# Patient Record
Sex: FEMALE | Race: WHITE
[De-identification: names, ages, dates, MRNs, and addresses within clinical notes are randomized per-mention and may not be internally consistent; named-entity substitution may affect disease eponyms.]

---

## 2017-01-11 ENCOUNTER — HOSPITAL ENCOUNTER (EMERGENCY)
Dept: HOSPITAL 39 - ER | Age: 47
Discharge: HOME | End: 2017-01-11
Payer: SELF-PAY

## 2017-01-11 VITALS — DIASTOLIC BLOOD PRESSURE: 75 MMHG | SYSTOLIC BLOOD PRESSURE: 107 MMHG | OXYGEN SATURATION: 99 %

## 2017-01-11 VITALS — TEMPERATURE: 98.3 F

## 2017-01-11 DIAGNOSIS — Z88.0: ICD-10-CM

## 2017-01-11 DIAGNOSIS — Z87.891: ICD-10-CM

## 2017-01-11 DIAGNOSIS — Z88.6: ICD-10-CM

## 2017-01-11 DIAGNOSIS — M54.81: Primary | ICD-10-CM

## 2017-01-11 DIAGNOSIS — Z86.19: ICD-10-CM

## 2017-01-11 NOTE — ED.PDOC
History of Present Illness





- General


Chief Complaint: General


Stated Complaint: scalp pain


Time Seen by Provider: 01/11/17 10:19


Source: patient


Exam Limitations: no limitations





- History of Present Illness


Initial Comments: 


the patient is a 46-year-old  female presenting due to scalp pain that 

was fairly severe 2 weeks ago for approximately a 24-hour period.  The pain 

largely went away for a period of 2 weeks but came back yesterday.  She reports 

it as a drawing, burning, crawling sensation.  She originally thought that it 

was from a scorpion sting.  She has not noted any true fevers.  She has felt 

some significant malaise for the last few weeks.  No focal neurological changes 

otherwise.  Hot water seems to make the pain worse.  No recent injuries.  No 

change in medications recently.  No confusion.  She does have a mild headache 

related to it.  Again no rash or skin changes.


Timing/Duration: 24 hours


Severity: moderate


Improving Factors: nothing


Worsening Factors: other - Hot water


Associated Symptoms: headaches


Allergies/Adverse Reactions: 


Allergies





Ketorolac Tromethamine [From Toradol] Allergy (Verified 01/11/17 10:37)


 


Penicillin G Allergy (Verified 01/11/17 10:37)


 








Home Medications: 


Ambulatory Orders





Gabapentin 300 mg PO Q8H #45 cap 01/11/17 


Levothyroxine Sodium [Synthroid] 50 mcg PO DAILY 01/11/17 


Valacyclovir HCl [Valtrex] 1 gm PO Q8H #21 tab 01/11/17 











Review of Systems





- Review of Systems


Constitutional: States: malaise


EENTM: States: no symptoms reported


Respiratory: States: no symptoms reported


Cardiology: States: no symptoms reported


Gastrointestinal/Abdominal: States: no symptoms reported


Genitourinary: States: no symptoms reported


Musculoskeletal: States: no symptoms reported


Skin: States: no symptoms reported


Neurological: States: paresthesia


Endocrine: States: no symptoms reported


All other Systems: No Change from Baseline





Past Medical History (General)





- Patient Medical History


Hx Seizures: No


Hx Stroke: No


Hx Dementia: No


Hx Asthma: No


Hx of COPD: No


Hx Cardiac Disorders: No


Hx Congestive Heart Failure: No


Hx Pacemaker: No


Hx Hypertension: No


Hx Thyroid Disease: No


Hx Diabetes: No


Hx Gastroesophageal Reflux: No


Hx Renal Disease: No


Hx of HIV: No


Hx Hepatitis C: Yes


Hx MRSA: No





- Vaccination History


Hx Tetanus, Diphtheria Vaccination: No


Hx Influenza Vaccination: Yes


Hx Pneumococcal Vaccination: No


Immunizations Up to Date: No





- Social History


Hx Tobacco Use: Yes


Hx Alcohol Use: Yes - occ


Hx Substance Use: No


Hx Physical Abuse: No


Hx Emotional Abuse: No


Hx Suspected Abuse: No





- Activities of Daily Living


Hospice Agency (if applicable):: None





- Female History


Patient is a Female of Child Bearing Age (10 -59 yrs old): No


Patient Pregnant: No





Family Medical History





- Family History


  ** Mother


Family History: No Known





Physical Exam





- Physical Exam


General Appearance: Alert, Comfortable, No apparent distress


Eye Exam: bilateral normal


Ears, Nose, Throat: normal ENT inspection, normal pharynx


Neck: full range of motion, supple, normal inspection


Respiratory: chest non-tender, lungs clear, normal breath sounds, no 

respiratory distress, no accessory muscle use


Cardiovascular/Chest: normal peripheral pulses, regular rate, rhythm, no edema


Peripheral Pulses: radial,right: 2+, radial,left: 2+


Gastrointestinal/Abdominal: normal bowel sounds, non tender, soft


Rectal Exam: deferred


Back Exam: normal inspection


Extremity: normal range of motion, non-tender, normal inspection, no pedal edema

, normal capillary refill


Neurologic: no motor/sensory deficits, alert, normal mood/affect, oriented x 3


Skin Exam: normal color


Comments: 


 Vital Signs - 24 hr











  01/11/17





  10:25


 


Temperature 98.3 F


 


Pulse Rate [ 95 H





pulse ox] 


 


Respiratory 18





Rate 


 


Blood Pressure 105/72





[Left Arm] 


 


O2 Sat by Pulse 95





Oximetry 








the scalp is tender to palpation over the area in question which does coincide 

with the greater occipital nerve on the left.  There are no skin lesions.  

There is no erythema.  There is no abscess.  There is no hair loss.





Progress





- Progress


Progress: 





01/11/17 11:52


the patient is a 46-year-old  female that appears to have occipital 

neuralgia in the distribution of the greater occipital nerve on the left.  She 

will be placed on Neurontin as well as Valtrex.  This is possibly shingles that 

has not yet broken the surface.  Aleve can be used as well for discomfort.  She 

does have a mild elevation of her liver function tests that does need to be 

followed up with her primary care doctor.  ER warnings are given for any acute 

worsening.  Lab work is reassuring.  If she fails to improve with the above 

treatment, then evaluation for possible impingement of the greater occipital 

nerve may be worthwhile, and treatment varied accordingly.  She needs to follow-

up with her primary care doctor within 1 week.





- Results/Orders


Results/Orders: 


 Laboratory Tests











  01/11/17





  10:48


 


WBC  8.3


 


RBC  5.27


 


Hgb  15.4


 


Hct  45.8


 


MCV  86.8


 


MCH  29.2


 


MCHC  33.6


 


RDW  13.5


 


Plt Count  232


 


MPV  9.6


 


Absolute Neuts (auto)  5.90


 


Absolute Lymphs (auto)  1.50


 


Absolute Monos (auto)  0.80


 


Absolute Eos (auto)  0.10


 


Absolute Basos (auto)  0.00


 


Neutrophils %  70.9


 


Lymphocytes %  17.7 L


 


Monocytes %  10.2 H


 


Eosinophils %  0.8 L


 


Basophils %  0.4


 


Sodium  137


 


Potassium  3.7


 


Chloride  105


 


Carbon Dioxide  27


 


Anion Gap  8.7 L


 


BUN  7


 


Creatinine  0.65


 


BUN/Creatinine Ratio  10.8


 


Random Glucose  100


 


Serum Osmolality  271.9 L


 


Calcium  8.8


 


Magnesium  1.9


 


Total Bilirubin  0.8


 


AST  53 H


 


ALT  68 H


 


Alkaline Phosphatase  57


 


Creatine Kinase  82


 


CK-MB (CK-2)  1.6


 


CK-MB (CK-2) %  Not Reportable


 


Troponin I  < 0.02


 


Serum Total Protein  7.7


 


Albumin  4.2


 


Globulin  3.5


 


Albumin/Globulin Ratio  1.2














Departure





- Departure


Clinical Impression: 


 Occipital neuralgia of left side


Disposition: Discharge to Home or Self Care


Departure Forms:  ED Discharge - Pt. Copy, Patient Portal Self Enrollment


Instructions:  DI for Neuralgia


Diet: regular diet


Activity: increase activity as tolerated


Referrals: 


DEMETRIA ROLDAN IV, FNP [Primary Care Provider] - 1-2 Weeks


Prescriptions: 


Gabapentin 300 mg PO Q8H #45 cap


Valacyclovir HCl [Valtrex] 1 gm PO Q8H #21 tab


Home Medications: 


Ambulatory Orders





Gabapentin 300 mg PO Q8H #45 cap 01/11/17 


Levothyroxine Sodium [Synthroid] 50 mcg PO DAILY 01/11/17 


Valacyclovir HCl [Valtrex] 1 gm PO Q8H #21 tab 01/11/17 








Additional Instructions: 


the patient is a 46-year-old  female that appears to have occipital 

neuralgia in the distribution of the greater occipital nerve on the left.  She 

will be placed on Neurontin as well as Valtrex.  This is possibly shingles that 

has not yet broken the surface.  Aleve can be used as well for discomfort.  She 

does have a mild elevation of her liver function tests that does need to be 

followed up with her primary care doctor.  ER warnings are given for any acute 

worsening.  Lab work is reassuring.  If she fails to improve with the above 

treatment, then evaluation for possible impingement of the greater occipital 

nerve may be worthwhile, and treatment varied accordingly.  She needs to follow-

up with her primary care doctor within 1 week.  intermittent Aleve may help as 

well.

## 2019-07-23 ENCOUNTER — HOSPITAL ENCOUNTER (OUTPATIENT)
Dept: HOSPITAL 39 - LAB.O | Age: 49
End: 2019-07-23
Attending: NURSE PRACTITIONER
Payer: COMMERCIAL

## 2019-07-23 DIAGNOSIS — B18.2: Primary | ICD-10-CM

## 2019-07-26 ENCOUNTER — HOSPITAL ENCOUNTER (OUTPATIENT)
Dept: HOSPITAL 39 - CT | Age: 49
End: 2019-07-26
Attending: NURSE PRACTITIONER
Payer: COMMERCIAL

## 2019-07-26 DIAGNOSIS — Z90.49: ICD-10-CM

## 2019-07-26 DIAGNOSIS — B18.2: Primary | ICD-10-CM

## 2019-07-26 NOTE — CT
EXAM DESCRIPTION: CT ABDOMEN WITHOUT AND WITH CONTRAST



CLINICAL HISTORY: CHRONIC VIRAL HEPATITIS



COMPARISON: None Available.



TECHNIQUE: CT of the abdomen is performed prior to and during IV

bolus administration of 100 mL Isovue 300. 



FINDINGS: 

The lung bases are clear.

The liver is normal in contour. The liver is upper limits of

normal in size measuring 17.6 cm in craniocaudal dimension. No

focal hepatic lesions seen. No evidence of periportal edema or

peripancreatic fluid collection. 

Prior cholecystectomy. There is normal caliber and tapering of

the CBD.

Spleen, pancreas, adrenals, and kidneys are unremarkable.

No bowel obstruction, the appendix is unremarkable.

There is no lymphadenopathy, inflammation, or free fluid

observed.



IMPRESSION:



Normal liver contour and enhancement.



Prior cholecystectomy.



This exam was performed according to our departmental

dose-optimization program, which includes automated exposure

control, adjustment of the mA and/or kV according to patient size

and/or use of iterative reconstruction technique.



Electronically signed by:  Avinash Steve DO  7/26/2019 11:45 AM CDT

Workstation: [id number]

## 2019-08-15 ENCOUNTER — HOSPITAL ENCOUNTER (OUTPATIENT)
Dept: HOSPITAL 39 - RAD | Age: 49
End: 2019-08-15
Attending: NURSE PRACTITIONER
Payer: COMMERCIAL

## 2019-08-15 DIAGNOSIS — M25.522: Primary | ICD-10-CM

## 2019-08-15 NOTE — RAD
EXAM DESCRIPTION: Elbow,Left 2 x-ray Views



CLINICAL HISTORY: PAIN IN LEFT ELBOW



COMPARISON: None Available.



TECHNIQUE: AP, Lateral x-ray views



FINDINGS: 

Two-view left elbow shows no fracture or dislocation. No

displacement of the distal humeral fat pads. Radial head and

capitellum are normally aligned on both views.

There is no bone lesion.

There are no significant arthritic changes.

There is no radiopaque foreign body.



IMPRESSION:



Negative for fracture.



Electronically signed by:  Abhinav Pina MD  8/15/2019 2:23

PM CDT Workstation: [id number]

## 2019-11-20 ENCOUNTER — HOSPITAL ENCOUNTER (OUTPATIENT)
Dept: HOSPITAL 39 - AMB | Age: 49
Discharge: HOME | End: 2019-11-20
Attending: INTERNAL MEDICINE
Payer: COMMERCIAL

## 2019-11-20 VITALS — OXYGEN SATURATION: 97 % | TEMPERATURE: 98.1 F

## 2019-11-20 VITALS — SYSTOLIC BLOOD PRESSURE: 115 MMHG | DIASTOLIC BLOOD PRESSURE: 78 MMHG

## 2019-11-20 DIAGNOSIS — F17.210: ICD-10-CM

## 2019-11-20 DIAGNOSIS — D12.4: ICD-10-CM

## 2019-11-20 DIAGNOSIS — B18.2: ICD-10-CM

## 2019-11-20 DIAGNOSIS — Z88.1: ICD-10-CM

## 2019-11-20 DIAGNOSIS — Z88.0: ICD-10-CM

## 2019-11-20 DIAGNOSIS — Z88.5: ICD-10-CM

## 2019-11-20 DIAGNOSIS — Z12.11: Primary | ICD-10-CM

## 2019-11-20 DIAGNOSIS — K59.04: ICD-10-CM

## 2019-11-20 DIAGNOSIS — Z90.49: ICD-10-CM

## 2019-11-20 DIAGNOSIS — D12.8: ICD-10-CM

## 2019-11-20 PROCEDURE — 00812 ANES LWR INTST SCR COLSC: CPT

## 2019-11-20 PROCEDURE — 45385 COLONOSCOPY W/LESION REMOVAL: CPT

## 2019-11-20 NOTE — OP
DATE OF PROCEDURE:  11/20/19



PREOPERATIVE DIAGNOSIS: 

1.  Colorectal cancer screening.



POSTOPERATIVE DIAGNOSIS: 

1.  Colonic polyps. 



PROCEDURE: 

1.  Colonoscopy with polypectomy.



SURGEON:  Santiago Benrabe MD



ANESTHESIA:  Monitored anesthesia care.



ESTIMATED BLOOD LOSS:  Less than 5 mL.



COMPLICATIONS:  None.



PROCEDURE:  The patient was placed in the left lateral decubitus position.  A 
time-out was performed.  A digital rectal exam was performed and was noted to be
unremarkable.  After deep sedation was achieved, the Olympus adult colonoscope 
was inserted through anus, into the rectum and advanced to the cecum under 
direct visualization.  The cecum was identified by the terminal ileum, ileocecal
valve and the appendiceal orifice.  Photodocumentation of these locations was 
performed.  The patients bowel preparation was good.  The endoscope was then 
progressively withdrawn and the total colonic lumen evaluated.  Retroflexion was
performed in the rectum.  The endoscope was then withdrawn and the procedure 
terminated.  The patient tolerated the procedure well with no immediate 
complications.  



FINDINGS:

1.  One 4 mm sessile polyp was found in the descending colon, this polyp was 
removed

     with a cold snare and retrieved for pathology.

2.  One 8 mm semi-pedunculated polyp was found in the rectum.  This polyp was

     removed with a cold snare and retrieved for pathology.

3.  The remainder of the colonoscopy exam was otherwise unremarkable.



IMPRESSION:  

1.  Colonic polyps, removed as above.



RECOMMENDATIONS:  

1.  Okay to discharge home once the patient meets discharge criteria.

2.  Resume prior diet.

3.  Resume home medications.

4.  Repeat colonoscopy in 5 to 10 years depending on the pathology results.

5.  Followup in the GI clinic with Dr. Bernabe in 2 to 3 months.



#[id number]

[facility]